# Patient Record
Sex: MALE | Race: WHITE | NOT HISPANIC OR LATINO | Employment: OTHER | URBAN - NONMETROPOLITAN AREA
[De-identification: names, ages, dates, MRNs, and addresses within clinical notes are randomized per-mention and may not be internally consistent; named-entity substitution may affect disease eponyms.]

---

## 2017-09-12 ENCOUNTER — HOSPITAL ENCOUNTER (EMERGENCY)
Facility: HOSPITAL | Age: 62
Discharge: HOME | End: 2017-09-12
Attending: EMERGENCY MEDICINE | Admitting: EMERGENCY MEDICINE

## 2017-09-12 VITALS
BODY MASS INDEX: 27.34 KG/M2 | SYSTOLIC BLOOD PRESSURE: 134 MMHG | HEIGHT: 74 IN | OXYGEN SATURATION: 97 % | RESPIRATION RATE: 18 BRPM | WEIGHT: 213 LBS | HEART RATE: 84 BPM | TEMPERATURE: 97.8 F | DIASTOLIC BLOOD PRESSURE: 83 MMHG

## 2017-09-12 DIAGNOSIS — J20.9 ACUTE BRONCHITIS, UNSPECIFIED ORGANISM: Primary | ICD-10-CM

## 2017-09-12 DIAGNOSIS — R91.8 ABNORMAL CT SCAN OF LUNG: ICD-10-CM

## 2017-09-12 PROCEDURE — 99282 EMERGENCY DEPT VISIT SF MDM: CPT

## 2017-09-12 NOTE — ED PROVIDER NOTES
"Subjective   HPI Comments: 62-year-old male was sent here to be evaluated emergently after he had an abnormal CT scan reading in [facility] ([place] earlier today.    He tells me that he began having a cough 4 days ago with some white-pale yellow sputum.  3 days ago he had a fever of approximately 102 but no night sweats, hemoptysis, weight loss or recent out of country travel.  He does have a history of emphysema and continues to smoke about 1 pack per day.  He saw his PCP yesterday and was placed on doxycycline.  The CT scan was performed because of his past history of an abnormal CT scan.  In April 2016 he had a CT scan of the lungs which showed a thin-walled cystic mass of the left lower lobe with an air-fluid level.  It measured 6.9 cm by 5.2 cm.  He ended up seeing a pulmonologist at that time, Dr. Dean, and according to patient, this was not felt to be anything emergent.  He did end up having a follow-up CT scan of the lungs November 2016.  His CT scan today that was performed in [facility] ([place] showed \"chronic thin-walled cavitary lesion in the left lower lobe measuring 5.1 x 3.8 cm, stable from prior exam however, a new air-fluid level suggesting secondary infection of the cavity... This can be seen with TB or fungal disease\".  The patient tells me he is feeling much better just in the past 24 hours.  I spoke to the patient's PCP on the phone today and she did place him on doxycycline yesterday and he was also given steroids.  I asked the patient if he ever had an abnormal PPD in the past and he denies.  He did work for the [employer] as a .      Review of Systems   Constitutional: Positive for fever. Negative for activity change, appetite change, chills, diaphoresis and unexpected weight change.   HENT: Positive for congestion. Negative for ear pain, facial swelling, mouth sores, postnasal drip, rhinorrhea, sinus pressure, sneezing, sore throat and voice change.    Eyes: Negative.  "   Respiratory: Positive for cough. Negative for shortness of breath.    Cardiovascular: Negative.  Negative for leg swelling.   Gastrointestinal: Negative.    Endocrine: Negative.    Genitourinary: Negative.    Musculoskeletal: Negative for arthralgias, back pain, joint swelling, myalgias, neck pain and neck stiffness.   Skin: Negative.    Allergic/Immunologic: Negative for immunocompromised state.   Neurological: Negative.    Hematological: Negative.    Psychiatric/Behavioral: Negative.    All other systems reviewed and are negative.      Past Medical History:   Diagnosis Date   • Abnormal lung scan    • Arthritis        No Known Allergies    Past Surgical History:   Procedure Laterality Date   • CHOLECYSTECTOMY     • ORTHOPEDIC SURGERY     • TONSILLECTOMY         History reviewed. No pertinent family history.    Social History     Social History   • Marital status:      Spouse name: N/A   • Number of children: N/A   • Years of education: N/A     Social History Main Topics   • Smoking status: Current Every Day Smoker     Packs/day: 1.00     Types: Cigarettes   • Smokeless tobacco: None   • Alcohol use No   • Drug use: No   • Sexual activity: Not Asked     Other Topics Concern   • None     Social History Narrative   • None           Objective   Physical Exam   Constitutional: He is oriented to person, place, and time. He appears well-developed and well-nourished. No distress.   62-year-old  male who appears well.  His speech is normal and nonlabored.  He does not appear acutely or chronically ill   Eyes: Conjunctivae and EOM are normal. Right eye exhibits no discharge. Left eye exhibits no discharge. No scleral icterus.   Neck: Neck supple. No JVD present.   Cardiovascular: Normal rate, regular rhythm and normal heart sounds.    No murmur heard.  Pulmonary/Chest: Effort normal and breath sounds normal. No respiratory distress. He has no wheezes. He has no rales.   Abdominal: Bowel sounds are normal.  "He exhibits no distension.   Neurological: He is alert and oriented to person, place, and time. No cranial nerve deficit. He exhibits normal muscle tone. Coordination normal.   Skin: Skin is warm and dry. No rash noted. He is not diaphoretic. No erythema. No pallor.   Psychiatric: He has a normal mood and affect. His behavior is normal. Thought content normal.   Vitals reviewed.      Procedures         ED Course  ED Course   Comment By Time   I spoke to the pulmonologist on call, Dr. Drake, and reviewed the patient's current clinical symptoms as well as the finding on today's CT scan from [facility] but also reviewed with him the CT report of the lungs that was obtained here in April 2016.  The CT report April 12, 2016 in brief, showed a thin-walled cystic mass of the left lower lobe 6.9 cm x 5.2 cm WITH an air-fluid level.  The patient tells me he had a follow-up CT scan in November 2016, after seeing Dr. Dean, a pulmonologist and that he needed \"no further follow-up.\"  Apparently the CT scan November 2016 did not show an air-fluid level and today's CT does show an air-fluid level.  The patient goes on to tell me that when he saw the pulmonologist last year he was told \"I might need an antibiotic in the future when I get these symptoms\" because of the cystic lesion of the lung.  Today CT scan the left lower lobe chronic thin-walled cavitary lesion measures 5.1 x 3.8 cm and this is decreased from April 12, 2016.  Because the patient appears very well clinically, has normal vital signs, no weight loss, no hemoptysis, no night sweats, I'm going to cover him with an antibiotic and have him follow-up with his PCP.  I did discuss this with the pulmonologist today as above and he felt this would be reasonable given the details I am telling him.  I called the patient's PCP and as of 1300 and awaiting a call back.  I'm going to place the patient on doxycycline and have him follow up with Dr. Sanchez later this week " Rico Carlton PA-C 09/12 2777   I discussed that the patient needs to call his PCP tomorrow and make sure he has follow-up either later this week or early next week.  Also, complete all of the antibiotic.  Smoking cessation was discussed and it sounds like he is honestly not ready to quit smoking.  Did discuss briefly risks and benefits of chronic tobacco usage and cessation Rico Carlton PA-C 09/12 1320                  MDM    Final diagnoses:   Acute bronchitis, unspecified organism   Abnormal CT scan of lung            Rico Carlton PA-C  09/12/17 [id number]

## 2017-09-12 NOTE — ED NOTES
Dr. Drake was called at 1243, the call was transferred to OTILIO Gómez, at this time.      Massimo Hung  09/12/17 1600

## 2017-09-27 ENCOUNTER — OFFICE VISIT (OUTPATIENT)
Dept: PULMONOLOGY | Facility: CLINIC | Age: 62
End: 2017-09-27

## 2017-09-27 VITALS
SYSTOLIC BLOOD PRESSURE: 110 MMHG | OXYGEN SATURATION: 96 % | RESPIRATION RATE: 16 BRPM | HEIGHT: 74 IN | BODY MASS INDEX: 27.34 KG/M2 | WEIGHT: 213 LBS | HEART RATE: 81 BPM | DIASTOLIC BLOOD PRESSURE: 80 MMHG

## 2017-09-27 DIAGNOSIS — R93.89 ABNORMAL CT OF THE CHEST: Primary | ICD-10-CM

## 2017-09-27 DIAGNOSIS — F17.200 SMOKING: ICD-10-CM

## 2017-09-27 DIAGNOSIS — J30.89 OTHER ALLERGIC RHINITIS: ICD-10-CM

## 2017-09-27 PROCEDURE — 99244 OFF/OP CNSLTJ NEW/EST MOD 40: CPT | Performed by: INTERNAL MEDICINE

## 2017-09-27 RX ORDER — FLUTICASONE PROPIONATE 50 MCG
1 SPRAY, SUSPENSION (ML) NASAL DAILY
Qty: 1 BOTTLE | Refills: 5 | Status: SHIPPED | OUTPATIENT
Start: 2017-09-27

## 2018-01-09 ENCOUNTER — TELEPHONE (OUTPATIENT)
Dept: PULMONOLOGY | Facility: CLINIC | Age: 63
End: 2018-01-09

## 2018-01-09 NOTE — TELEPHONE ENCOUNTER
I have been unable to reach patient by phone or mail to schedule pt. Remind pt of his apt for ct scan and for follow up. Pt wife finally answered her phone today. Pt wife states they have moved back to [place], and she has made her spouse a follow up apt with another